# Patient Record
Sex: FEMALE | Race: WHITE
[De-identification: names, ages, dates, MRNs, and addresses within clinical notes are randomized per-mention and may not be internally consistent; named-entity substitution may affect disease eponyms.]

---

## 2019-02-05 ENCOUNTER — HOSPITAL ENCOUNTER (EMERGENCY)
Dept: HOSPITAL 31 - C.ER | Age: 24
Discharge: HOME | End: 2019-02-05
Payer: SELF-PAY

## 2019-02-05 VITALS — SYSTOLIC BLOOD PRESSURE: 128 MMHG | HEART RATE: 81 BPM | DIASTOLIC BLOOD PRESSURE: 80 MMHG | TEMPERATURE: 97 F

## 2019-02-05 VITALS — OXYGEN SATURATION: 100 %

## 2019-02-05 VITALS — RESPIRATION RATE: 20 BRPM

## 2019-02-05 DIAGNOSIS — F12.90: Primary | ICD-10-CM

## 2019-02-05 NOTE — C.PDOC
History Of Present Illness


23 year old female presents to the ER for evaluation of nausea and some vomiting

after eating a brownie that had marijuana in it at midnight. Patient is unsure 

how much marijuana was in it and states she got it from "this martina". Denies ETOH 

use or other complaints at this time.


Time Seen by Provider: 02/05/19 04:06


Chief Complaint (Nursing): Substance Abuse


History Per: Patient


History/Exam Limitations: no limitations


Onset/Duration Of Symptoms: Hrs


Current Symptoms Are (Timing): Still Present


Suicide/Self Injury Attempted (Context): None


Modifying Factor(s): Marijuana


Involuntary Hold By: None


Recent travel outside of the [place]: No





Past Medical History


Reviewed: Historical Data, Nursing Documentation, Vital Signs


Vital Signs: 





                                Last Vital Signs











Temp  99.7 F H  02/05/19 04:11


 


Pulse  110 H  02/05/19 04:11


 


Resp  20   02/05/19 04:11


 


BP  118/75   02/05/19 04:11


 


Pulse Ox  100   02/05/19 04:11











Family History: States: Unknown Family Hx





- Social History


Hx Alcohol Use: Yes


Hx Substance Use: Yes (marijuana first time today 2/5/2019)





Review Of Systems


Except As Marked, All Systems Reviewed And Found Negative.


Constitutional: Negative for: Fever, Chills


Cardiovascular: Negative for: Chest Pain, Palpitations


Respiratory: Negative for: Cough, Shortness of Breath


Gastrointestinal: Negative for: Nausea, Vomiting


Neurological: Negative for: Weakness, Numbness





Physical Exam





- Physical Exam


Appears: Non-toxic


Skin: Normal Color, Warm, Dry, No Rash


Head: Atraumatic, Normacephalic


Eye(s): bilateral: Normal Inspection, PERRL, EOMI


Oral Mucosa: Moist


Neck: Normal, Normal ROM, No Midline Cervical Tenderness, No Paracervical 

Tenderness, Supple


Chest: Symmetrical, No Tenderness


Cardiovascular: Rhythm Regular, No Friction Rub, No Murmur


Respiratory: Normal Breath Sounds, No Rales, No Rhonchi, No Wheezing


Gastrointestinal/Abdominal: Soft, No Tenderness


Back: No CVA Tenderness


Extremity: Normal ROM (x4), No Swelling


Neurological/Psych: Oriented x3, Normal Speech


Gait: Steady





ED Course And Treatment


O2 Sat by Pulse Oximetry: 100 (Room air)


Pulse Ox Interpretation: Normal





Medical Decision Making


Medical Decision Making: 


Patient is resting comfortably in the ER in no acute distress, ambulatory with 

steady gait, clinically sober, vitals are stable, will discharge home with 

instructions to follow up with PMD. Stop using drugs





Disposition





- Disposition


Referrals: 


[facility] at [facility] [Outside]


Disposition: HOME/ ROUTINE


Disposition Time: 05:34


Condition: STABLE


Additional Instructions: 


Follow up with the medical doctor within 1-2 days. Return if worsened. 


Instructions:  Marijuana


Forms:  [employer] Connect (English)





- Clinical Impression


Clinical Impression: 


 Marijuana use








- PA / NP / Resident Statement


MD/DO has reviewed & agrees with the documentation as recorded.





- Scribe Statement


The provider has reviewed the documentation as recorded by the Scribe





Ant Combs





All medical record entries made by the Annamariaibgene were at my direction and 

personally dictated by me. I have reviewed the chart and agree that the record 

accurately reflects my personal performance of the history, physical exam, 

medical decision making, and the department course for this patient. I have also

 personally directed, reviewed, and agree with the discharge instructions and 

disposition.